# Patient Record
Sex: MALE | Race: BLACK OR AFRICAN AMERICAN | NOT HISPANIC OR LATINO | URBAN - METROPOLITAN AREA
[De-identification: names, ages, dates, MRNs, and addresses within clinical notes are randomized per-mention and may not be internally consistent; named-entity substitution may affect disease eponyms.]

---
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
## 2022-04-19 NOTE — ED PROVIDER NOTE - CONDITION AT DISCHARGE:
Patient stated that not able to find the doses of the Vitamin D and Calcium together. Advised patient if can not find the doses together in 1 tablet then might need to get them separately. Patient did find the doses but also had K2, not sure if that is ok? Improved

## 2023-08-17 ENCOUNTER — EMERGENCY (EMERGENCY)
Facility: HOSPITAL | Age: 23
LOS: 1 days | Discharge: HOME | End: 2023-08-17
Attending: STUDENT IN AN ORGANIZED HEALTH CARE EDUCATION/TRAINING PROGRAM | Admitting: STUDENT IN AN ORGANIZED HEALTH CARE EDUCATION/TRAINING PROGRAM
Payer: MEDICAID

## 2023-08-17 VITALS
HEART RATE: 75 BPM | DIASTOLIC BLOOD PRESSURE: 85 MMHG | SYSTOLIC BLOOD PRESSURE: 138 MMHG | OXYGEN SATURATION: 100 % | RESPIRATION RATE: 18 BRPM | TEMPERATURE: 99 F

## 2023-08-17 PROCEDURE — 73600 X-RAY EXAM OF ANKLE: CPT | Mod: 26,LT

## 2023-08-17 PROCEDURE — 99284 EMERGENCY DEPT VISIT MOD MDM: CPT

## 2023-08-17 PROCEDURE — 73620 X-RAY EXAM OF FOOT: CPT | Mod: 26,LT

## 2023-08-17 RX ORDER — ACETAMINOPHEN 500 MG
975 TABLET ORAL ONCE
Refills: 0 | Status: COMPLETED | OUTPATIENT
Start: 2023-08-17 | End: 2023-08-17

## 2023-08-17 RX ORDER — IBUPROFEN 200 MG
600 TABLET ORAL ONCE
Refills: 0 | Status: COMPLETED | OUTPATIENT
Start: 2023-08-17 | End: 2023-08-17

## 2023-08-17 RX ADMIN — Medication 600 MILLIGRAM(S): at 17:53

## 2023-08-17 RX ADMIN — Medication 975 MILLIGRAM(S): at 17:52

## 2023-08-17 NOTE — ED PROVIDER NOTE - PATIENT PORTAL LINK FT
You can access the FollowMyHealth Patient Portal offered by [facility] by registering at the following website: [email or web address]. By joining [facility]’s FollowMyHealth portal, you will also be able to view your health information using other applications (apps) compatible with our system.
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
## 2023-08-17 NOTE — ED PROVIDER NOTE - NSFOLLOWUPINSTRUCTIONS_ED_ALL_ED_FT
You were seen in the Emergency Department for foot pain and swelling.   Today you had x-rays, the results are attached.    Please follow-up with your orthopedic doctor within the next few days    1) Advance activity as tolerated.    2) Continue all previously prescribed medications as directed.    3) Follow up with your primary care physician in 24-48 hours - take copies of your results.    4) Return to the Emergency Department for worsening or persistent symptoms, and/or ANY NEW OR CONCERNING SYMPTOMS as described below.

## 2023-08-17 NOTE — ED PROVIDER NOTE - NSFOLLOWUPCLINICS_GEN_ALL_ED_FT
[facility] Orthopedic Surgery  Orthopedic Surgery  [address]th [place] [address]  Phone: [phone]  Fax:   Follow Up Time: 7-10 Days

## 2023-08-17 NOTE — ED PROVIDER NOTE - PHYSICAL EXAMINATION
[Const] well-appearing, resting comfortably, no acute distress  [HEENT] PERRL, EOMI, moist mucus membranes  [Neck] Supple, trachea midline  [CV] +S1/S2, no m/r/g appreciated  [Lungs] Clear to auscultations bilaterally, no adventitious lung sounds  [Abd] soft, non-tender, nondistended in all 4 quadrants  [MSK] 5/5 upper extremity and lower extremity str bilaterally with exception of left foot--mild dorsal swelling and TTP, 2+ dP, dec plantar flexion/extension 2/2 swelling and pain  [Skin] warm, dry, well-perfused  [Neuro] A&Ox3

## 2023-08-17 NOTE — ED PROVIDER NOTE - CLINICAL SUMMARY MEDICAL DECISION MAKING FREE TEXT BOX
22 y/o M w/ no pmh presents with L ankle swelling/pain, difficulty ambulating 2/2 pain, no neurovascular defects. symptomatic support with tylenol/ibuprofen, ice packs, XR ankle/foot, reassess.
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
